# Patient Record
Sex: MALE | Race: OTHER | URBAN - METROPOLITAN AREA
[De-identification: names, ages, dates, MRNs, and addresses within clinical notes are randomized per-mention and may not be internally consistent; named-entity substitution may affect disease eponyms.]

---

## 2018-03-09 ENCOUNTER — OFFICE VISIT (OUTPATIENT)
Dept: FAMILY MEDICINE CLINIC | Age: 58
End: 2018-03-09

## 2018-03-09 ENCOUNTER — HOSPITAL ENCOUNTER (OUTPATIENT)
Dept: LAB | Age: 58
Discharge: HOME | End: 2018-03-09

## 2018-03-09 VITALS
SYSTOLIC BLOOD PRESSURE: 176 MMHG | WEIGHT: 214 LBS | DIASTOLIC BLOOD PRESSURE: 89 MMHG | HEIGHT: 66 IN | HEART RATE: 75 BPM | TEMPERATURE: 99.1 F | BODY MASS INDEX: 34.39 KG/M2

## 2018-03-09 DIAGNOSIS — E11.9 TYPE 2 DIABETES MELLITUS WITHOUT COMPLICATION, WITHOUT LONG-TERM CURRENT USE OF INSULIN (HCC): Primary | ICD-10-CM

## 2018-03-09 DIAGNOSIS — I10 ESSENTIAL HYPERTENSION: ICD-10-CM

## 2018-03-09 DIAGNOSIS — Z13.9 ENCOUNTER FOR SCREENING: ICD-10-CM

## 2018-03-09 DIAGNOSIS — E11.9 TYPE 2 DIABETES MELLITUS WITHOUT COMPLICATION, WITHOUT LONG-TERM CURRENT USE OF INSULIN (HCC): ICD-10-CM

## 2018-03-09 LAB
ALBUMIN SERPL-MCNC: 3.6 G/DL (ref 3.5–5)
ALBUMIN/GLOB SERPL: 0.9 {RATIO} (ref 1.1–2.2)
ALP SERPL-CCNC: 99 U/L (ref 45–117)
ALT SERPL-CCNC: 29 U/L (ref 12–78)
ANION GAP SERPL CALC-SCNC: 9 MMOL/L (ref 5–15)
APPEARANCE UR: CLEAR
AST SERPL-CCNC: 15 U/L (ref 15–37)
BACTERIA URNS QL MICRO: NEGATIVE /HPF
BILIRUB SERPL-MCNC: 0.5 MG/DL (ref 0.2–1)
BILIRUB UR QL: NEGATIVE
BUN SERPL-MCNC: 16 MG/DL (ref 6–20)
BUN/CREAT SERPL: 20 (ref 12–20)
CALCIUM SERPL-MCNC: 9 MG/DL (ref 8.5–10.1)
CHLORIDE SERPL-SCNC: 104 MMOL/L (ref 97–108)
CHOLEST SERPL-MCNC: 208 MG/DL
CO2 SERPL-SCNC: 25 MMOL/L (ref 21–32)
COLOR UR: ABNORMAL
CREAT SERPL-MCNC: 0.82 MG/DL (ref 0.7–1.3)
CREAT UR-MCNC: 88.8 MG/DL
EPITH CASTS URNS QL MICRO: ABNORMAL /LPF
ERYTHROCYTE [DISTWIDTH] IN BLOOD BY AUTOMATED COUNT: 12.8 % (ref 11.5–14.5)
EST. AVERAGE GLUCOSE BLD GHB EST-MCNC: 171 MG/DL
GLOBULIN SER CALC-MCNC: 4 G/DL (ref 2–4)
GLUCOSE POC: 300 MG/DL
GLUCOSE SERPL-MCNC: 266 MG/DL (ref 65–100)
GLUCOSE UR STRIP.AUTO-MCNC: >1000 MG/DL
HBA1C MFR BLD: 7.6 % (ref 4.2–6.3)
HCT VFR BLD AUTO: 50.7 % (ref 36.6–50.3)
HDLC SERPL-MCNC: 69 MG/DL
HDLC SERPL: 3 {RATIO} (ref 0–5)
HGB BLD-MCNC: 17.4 G/DL (ref 12.1–17)
HGB UR QL STRIP: ABNORMAL
HYALINE CASTS URNS QL MICRO: ABNORMAL /LPF (ref 0–5)
KETONES UR QL STRIP.AUTO: NEGATIVE MG/DL
LDLC SERPL CALC-MCNC: 121 MG/DL (ref 0–100)
LEUKOCYTE ESTERASE UR QL STRIP.AUTO: NEGATIVE
LIPID PROFILE,FLP: ABNORMAL
MCH RBC QN AUTO: 29.4 PG (ref 26–34)
MCHC RBC AUTO-ENTMCNC: 34.3 G/DL (ref 30–36.5)
MCV RBC AUTO: 85.8 FL (ref 80–99)
MICROALBUMIN UR-MCNC: 228 MG/DL
MICROALBUMIN/CREAT UR-RTO: 2568 MG/G (ref 0–30)
NITRITE UR QL STRIP.AUTO: NEGATIVE
NRBC # BLD: 0 K/UL (ref 0–0.01)
NRBC BLD-RTO: 0 PER 100 WBC
PH UR STRIP: 5.5 [PH] (ref 5–8)
PLATELET # BLD AUTO: 212 K/UL (ref 150–400)
PMV BLD AUTO: 11.6 FL (ref 8.9–12.9)
POTASSIUM SERPL-SCNC: 4.5 MMOL/L (ref 3.5–5.1)
PROT SERPL-MCNC: 7.6 G/DL (ref 6.4–8.2)
PROT UR STRIP-MCNC: 300 MG/DL
RBC # BLD AUTO: 5.91 M/UL (ref 4.1–5.7)
RBC #/AREA URNS HPF: ABNORMAL /HPF (ref 0–5)
SODIUM SERPL-SCNC: 138 MMOL/L (ref 136–145)
SP GR UR REFRACTOMETRY: 1.02 (ref 1–1.03)
TRIGL SERPL-MCNC: 90 MG/DL (ref ?–150)
UROBILINOGEN UR QL STRIP.AUTO: 0.2 EU/DL (ref 0.2–1)
VLDLC SERPL CALC-MCNC: 18 MG/DL
WBC # BLD AUTO: 6.9 K/UL (ref 4.1–11.1)
WBC URNS QL MICRO: ABNORMAL /HPF (ref 0–4)

## 2018-03-09 PROCEDURE — 80061 LIPID PANEL: CPT | Performed by: NURSE PRACTITIONER

## 2018-03-09 PROCEDURE — 85027 COMPLETE CBC AUTOMATED: CPT | Performed by: NURSE PRACTITIONER

## 2018-03-09 PROCEDURE — 80053 COMPREHEN METABOLIC PANEL: CPT | Performed by: NURSE PRACTITIONER

## 2018-03-09 PROCEDURE — 82043 UR ALBUMIN QUANTITATIVE: CPT | Performed by: NURSE PRACTITIONER

## 2018-03-09 PROCEDURE — 81001 URINALYSIS AUTO W/SCOPE: CPT | Performed by: NURSE PRACTITIONER

## 2018-03-09 PROCEDURE — 83036 HEMOGLOBIN GLYCOSYLATED A1C: CPT | Performed by: NURSE PRACTITIONER

## 2018-03-09 RX ORDER — METFORMIN HYDROCHLORIDE 500 MG/1
1000 TABLET, EXTENDED RELEASE ORAL 2 TIMES DAILY WITH MEALS
Qty: 120 TAB | Refills: 3 | Status: SHIPPED | OUTPATIENT
Start: 2018-03-09 | End: 2018-04-30 | Stop reason: SDUPTHER

## 2018-03-09 RX ORDER — LISINOPRIL AND HYDROCHLOROTHIAZIDE 12.5; 2 MG/1; MG/1
1 TABLET ORAL
Qty: 30 TAB | Refills: 3 | Status: SHIPPED | OUTPATIENT
Start: 2018-03-09 | End: 2018-04-30 | Stop reason: SDUPTHER

## 2018-03-09 RX ORDER — GLIPIZIDE 10 MG/1
10 TABLET ORAL 2 TIMES DAILY
Qty: 60 TAB | Refills: 3 | Status: SHIPPED | OUTPATIENT
Start: 2018-03-09 | End: 2018-04-30 | Stop reason: SDUPTHER

## 2018-03-09 NOTE — PROGRESS NOTES
The following was performed at discharge station per provider with the assistance of , Sandhya Banuelos:   AVS printed, reviewed with pt and given to pt. Reviewed medications with pt. Gave pt the glucometer resource sheet and reviewed it with him. Pt stated he has used a glucometer in the past.  Pt has appts for follow up and also with nutritionist.   Pt expressed understanding of the above information. Joellen Arevalo.

## 2018-03-09 NOTE — PATIENT INSTRUCTIONS
Aprenda acerca de la dieta mediterránea - [ Learning About the [address] Diet ]  ¿Qué es la dieta mediterránea? La dieta mediterránea es un estilo de alimentación, en lugar de un plan de dieta. Consiste en alimentos consumidos en Lajune Canner, el sara de [place] y [place], y otros países a lo tima del [facility]. Resalta comer alimentos jeane pescado, frutas, verduras, frijoles (habichuelas), panes ricos en fibra y granos integrales, michelle secos y aceite de [place]. Julianna estilo de alimentación incluye comer cantidades limitadas de carne Fern Spare y dulces. ¿Por qué elegir la dieta mediterránea? Tabares Even de estilo mediterráneo puede mejorar la leo del corazón. Contiene más grasa que otras dietas saludables para el corazón. Leo las grasas provienen principalmente de michelle secos, aceites no saturados (jeane los aceites del pescado y el aceite de [place]) y ciertos aceites de michelle secos o semillas (tales jeane el aceite de canola, soya o Tammy Bunk). Estas grasas pueden ayudar a proteger Kip Gains y los vasos sanguíneos. ¿Cómo puede usted comenzar reji dieta mediterránea? Estas son algunas cosas que usted puede hacer para cambiar a un estilo de alimentación más similar a la dieta mediterránea. Qué comer  · Coma reji variedad de frutas y verduras cada día, tales jeane uvas, arándanos, tomates, brócoli, pimientos, higos, aceitunas, espinacas, berenjenas, frijoles, lentejas y garbanzos. · Coma reji variedad de alimentos de grano integral cada día, tales jeane william, arroz integral y pan, pasta y cuscús integrales. · Coma pescado al menos 2 veces a la semana. Pruebe el atún, el salmón, la caballa, la [place], el arenque o las belkis. · Consuma cantidades moderadas de productos lácteos bajos en grasa, jeane Maddy [place] o yogur. · Consuma cantidades moderadas de aves y [place].   · Elija grasas saludables (insaturadas), jeane michelle secos, aceite de zuniga y ciertos aceites de michelle secos o semillas, jeane el de canola (colza), soya y Silas Kub. · Limite las grasas no saludables (saturadas), jeane [place], aceite de ureña y aceite de [place]. Y limite las grasas de origen animal, jeane carne y productos lácteos elaborados con [employer] entera. Trate de comer missy wilkins solo unas pocas veces al mes en cantidades muy pequeñas. · Limite los dulces y postres a solo un par de veces a la semana. [place] incluye bebidas [facility]. La dieta mediterránea también puede incluir vino tinto con las comidas: 1 vaso cada día para las mujeres y WATZING 2 vasos al día para los hombres. Consejos al comer en casa  · Utilice hierbas, especias, ajo, corteza de ampaor y [place] de cítricos en lugar de sal para armando sabor a los alimentos. · Añada rodajas de aguacate a dewitt sándwich en lugar de tocino. · Consuma pescado en el almuerzo o la karen en lugar de carne [place]. Frote el pescado con aceite de zuniga y cocínelo al horno o a la ileana. · [place]ore[place] la ensalada con semillas o michelle secos en lugar de queso. · Cocine con aceite de zuniga o de canola en lugar de mantequilla o aceites con alto contenido de grasas saturadas. · Cambie de leche al 2% o entera a leche al 1% o descremada. · Unte las verduras crudas en un aderezo de vinagreta o puré de garbanzos en lugar de salsas hechas de mayonesa o crema agria. · Disfrute un pedazo de fruta para el postre en lugar de un pedazo de torta. Pruebe manzanas al horno, o coma algunas frutas secas. Consejos al comer afuera  · Pruebe pescado cocido, asado a la ileana u horneado, en lugar de comerlo frito o empanado. · Pídale al camarero (mesero) que le preparen la comida con aceite de zuniga en lugar de [place]. · Pida platos hechos con salsa marinera o salsas hechas con aceite de zuniga. Evite las salsas hechas con Milo Billing. · Elija panes integrales, pasta y masa de pizza hechas de afsaneh integral, arroz integral, frijoles y lentejas.   · Reduzca el consumo de mantequilla o margarina en el pan. En dewitt lugar, puede untar el pan en reji pequeña cantidad de aceite de zuniga. · [place] acompañamiento, pida reji ensalada pequeña o verduras asadas en lugar de maurisio fritas. ¿Dónde puede encontrar más información en inglés? Lori Lower a http://leah-franklin.info/. Escriba O407 en la búsqueda para aprender más acerca de \"Aprenda acerca de la dieta mediterránea - [ Learning About the [address] [email or web address] Diet ]. \"  Revisado: 12 azul, 2017  Versión del contenido: 11.4  © 5371-0995 Healthwise, Incorporated. Las instrucciones de cuidado fueron adaptadas bajo licencia por [employer] (which disclaims liability or warranty for this information). Si usted tiene [place] afección médica o sobre estas instrucciones, siempre pregunte a dewitt profesional de leo. Healthwise, Incorporated niega toda garantía o responsabilidad por dewitt uso de esta información.

## 2018-03-09 NOTE — PROGRESS NOTES
Subjective:     Chief Complaint   Patient presents with    Diabetes     Diabetes +  Medication needed    Elevated Blood Pressure     Elevated Blood Pressure        He  is a 66 y.o. male who presents for evaluation of DM 2 and HTN. Pt recently arrived from [facility] and stopped taking all Rx approx 2 weeks. Was on Rx for HTN and DM 2. Pt brought his records from PCP from [address] [facility]. Last labs approx 2 years ago. Reports he was checking sugars prior to running out of strips 1 month ago. AM sugars had previously been in the [id number], lows around 100 and highs of 300. No acute S&S nor health complaints today. PMH: HTN & DM 2    Surg:  Denies     NKDA denies     Current Rx/supplements: see list     Social:  Pt denies tobacco nor drug use. Drinks 1-4 beers/daily. Pt works in maintenance. Pt is not , 3 adult children. Family Hx: DM 2 and CA (unspecified type)         ROS  Gen - no fever/chills  Resp - no dyspnea or cough  CV - no chest pain or PARKER  Rest per HPI    No past medical history on file. No past surgical history on file. No current outpatient prescriptions on file prior to visit. No current facility-administered medications on file prior to visit.          Objective:     Vitals:    03/09/18 0855 03/09/18 0903   BP: (!) 185/91 176/89   Pulse: 75 75   Temp: 99.1 °F (37.3 °C)    TempSrc: Oral    Weight: 214 lb (97.1 kg)    Height: 5' 5.95\" (1.675 m)        Physical Examination:  General appearance - alert, well appearing, and in no distress  Eyes -sclera anicteric  Neck - supple, no significant adenopathy, no thyromegaly  Chest - clear to auscultation, no wheezes, rales or rhonchi, symmetric air entry  Heart - normal rate, regular rhythm, normal S1, S2, no murmurs, rubs, clicks or gallops  Neurological - alert, oriented, no focal findings or movement disorder noted  Abdomen-BS present/WNL x 4 quads, non-tender/distended, soft,no organomegaly    Recent Results (from the past 12 hour(s))   AMB POC GLUCOSE BLOOD, BY GLUCOSE MONITORING DEVICE    Collection Time: 03/09/18  9:05 AM   Result Value Ref Range    Glucose  mg/dL         Assessment/ Plan:   Diagnoses and all orders for this visit:    1. Type 2 diabetes mellitus without complication, without long-term current use of insulin (HCC)  -     METABOLIC PANEL, COMPREHENSIVE; Future  -     HEMOGLOBIN A1C WITH EAG; Future  -     CBC W/O DIFF; Future  -     MICROALBUMIN, UR, RAND W/ MICROALB/CREAT RATIO; Future  -     URINALYSIS W/ RFLX MICROSCOPIC; Future  -     LIPID PANEL; Future  -     metFORMIN ER (GLUCOPHAGE XR) 500 mg tablet; Take 2 Tabs by mouth two (2) times daily (with meals). -     glipiZIDE (GLUCOTROL) 10 mg tablet; Take 1 Tab by mouth two (2) times a day. -     REFERRAL TO NUTRITION    2. Essential hypertension  -     METABOLIC PANEL, COMPREHENSIVE; Future  -     CBC W/O DIFF; Future  -     MICROALBUMIN, UR, RAND W/ MICROALB/CREAT RATIO; Future  -     URINALYSIS W/ RFLX MICROSCOPIC; Future  -     LIPID PANEL; Future  -     lisinopril-hydroCHLOROthiazide (PRINZIDE, ZESTORETIC) 20-12.5 mg per tablet; Take 1 Tab by mouth every morning.  -     REFERRAL TO NUTRITION    3. Encounter for screening  -     AMB POC GLUCOSE BLOOD, BY GLUCOSE MONITORING DEVICE        Resume prior Rx, noting Pt should max out dose of metformin, 1g BID, to help w/ sugar and weight control. Check baseline labs. Refer to RD for nutrition counseling for DM 2 and HTN. Recommend Pt resume checking glucoses, will provide glucometer sheet at discharge. Re-eval BP and glucose response in 4-6 weeks. I have discussed the diagnosis with the patient and the intended plan as seen in the above orders. The patient has received an after-visit summary and questions were answered concerning future plans. I have discussed medication side effects and warnings with the patient as well.   The patient verbalizes understanding and agreement with the plan. Follow-up Disposition:  Return in about 6 weeks (around 4/20/2018).

## 2018-03-09 NOTE — PROGRESS NOTES
Results for orders placed or performed in visit on 03/09/18   AMB POC GLUCOSE BLOOD, BY GLUCOSE MONITORING DEVICE   Result Value Ref Range    Glucose  mg/dL

## 2018-03-15 ENCOUNTER — OFFICE VISIT (OUTPATIENT)
Dept: FAMILY MEDICINE CLINIC | Age: 58
End: 2018-03-15

## 2018-03-15 DIAGNOSIS — Z71.3 DIETARY COUNSELING AND SURVEILLANCE: Primary | ICD-10-CM

## 2018-03-23 NOTE — PROGRESS NOTES
Mr. Jennifer Linares was referred to RD for T2DM nutrition education. [facility]  #[id number]  Current weight 214 lbs  BMI 34.6 classifying pt as obesity class I  A1C 7.6 on 3/9/18  Mr. Jennifer Linares works [employer] \"I am active all day long. \" Walks occ for exercise  Mr. Jennifer Linares states that his diet was very different in [facility]. Currently, he states that he rents a room and is not comfortable using the common kitchen. Pt states that he has a mini fridge and a hot plate or micro in his room. Eats most meals outside of home  RD introduced CHO foods and how they affect BG. Discussed that some foods have added sugar and some have naturally occurring sugars. Using food models, RD displayed common CHO foods and their appropriate portion sizes. Emphasized that these foods need to be limited, portioned and always eaten in combination with PRO. RD suggested using eggs as a lean PRO choice as these can be kept in a mini fridge and boiled in his room. Discussed choosing low sodium or no added salt canned goods that can be reheated in his room in place of instant soups. RD provided handout on good snack combos. Pt indicates good understanding.  Will F/u in 2months

## 2018-04-27 ENCOUNTER — DOCUMENTATION ONLY (OUTPATIENT)
Dept: FAMILY MEDICINE CLINIC | Age: 58
End: 2018-04-27

## 2018-04-27 NOTE — PROGRESS NOTES
Letter to notify pt of upcoming appt on 4/30/18 was returned marked \"undeliverable as addressed\". RN called pt, now answer, no voicemail set up. No additional attempts can be made regarding this appt which will occur in 3 days (after weekend).   Cynthia Vasquez RN

## 2018-04-30 ENCOUNTER — OFFICE VISIT (OUTPATIENT)
Dept: FAMILY MEDICINE CLINIC | Age: 58
End: 2018-04-30

## 2018-04-30 VITALS
TEMPERATURE: 98.3 F | DIASTOLIC BLOOD PRESSURE: 76 MMHG | WEIGHT: 218 LBS | HEIGHT: 67 IN | HEART RATE: 76 BPM | BODY MASS INDEX: 34.21 KG/M2 | SYSTOLIC BLOOD PRESSURE: 159 MMHG

## 2018-04-30 DIAGNOSIS — Z13.9 ENCOUNTER FOR SCREENING: Primary | ICD-10-CM

## 2018-04-30 DIAGNOSIS — E11.9 TYPE 2 DIABETES MELLITUS WITHOUT COMPLICATION, WITHOUT LONG-TERM CURRENT USE OF INSULIN (HCC): ICD-10-CM

## 2018-04-30 DIAGNOSIS — I10 ESSENTIAL HYPERTENSION: ICD-10-CM

## 2018-04-30 LAB — GLUCOSE POC: NORMAL MG/DL

## 2018-04-30 RX ORDER — GLIPIZIDE 10 MG/1
10 TABLET ORAL 2 TIMES DAILY
Qty: 180 TAB | Refills: 3 | Status: SHIPPED | OUTPATIENT
Start: 2018-04-30 | End: 2019-02-07 | Stop reason: SDUPTHER

## 2018-04-30 RX ORDER — LISINOPRIL AND HYDROCHLOROTHIAZIDE 12.5; 2 MG/1; MG/1
1 TABLET ORAL
Qty: 90 TAB | Refills: 3 | Status: SHIPPED | OUTPATIENT
Start: 2018-04-30 | End: 2019-02-07 | Stop reason: SDUPTHER

## 2018-04-30 RX ORDER — AMLODIPINE BESYLATE 5 MG/1
5 TABLET ORAL DAILY
Qty: 90 TAB | Refills: 1 | Status: SHIPPED | OUTPATIENT
Start: 2018-04-30 | End: 2019-02-07 | Stop reason: SDUPTHER

## 2018-04-30 RX ORDER — METFORMIN HYDROCHLORIDE 500 MG/1
1000 TABLET, EXTENDED RELEASE ORAL 2 TIMES DAILY WITH MEALS
Qty: 360 TAB | Refills: 3 | Status: SHIPPED | OUTPATIENT
Start: 2018-04-30 | End: 2019-02-07 | Stop reason: SDUPTHER

## 2018-04-30 NOTE — PATIENT INSTRUCTIONS
Aprenda acerca de las pautas alimentarias para diabetes - [ Learning About Diabetes Food Guidelines ]  Instrucciones de cuidado    La planificación de las comidas es importante para el manejo de la diabetes. Ayuda a mantener el azúcar en la carlitos en el nivel ideal (que usted fija con dewitt médico). Usted no tiene que comer alimentos especiales. Puede comer lo mismo que dewitt boo, incluso dulces de vez en cuando. Leo debe prestar atención a la cantidad y la frecuencia con que come ciertos alimentos. Aurelio vez desee colaborar con un dietista o educador de diabetes certificado (CDE, por ian siglas en inglés) para que le ayuden a planificar las comidas y los refrigerios. Un dietista o CDE también puede ayudarle a bajar de peso si tirso es jessica de ian objetivos. ¿Qué debería saber acerca de ingerir carbohidratos? Manejar la cantidad de carbohidratos que ingiere es reji parte importante de la alimentación saludable cuando tiene diabetes. Los carbohidratos se encuentran en muchos alimentos. · Sepa qué alimentos contienen carbohidratos. Y aprenda qué cantidad de carbohidratos contienen los diferentes alimentos. ¨ El pan, los cereales, la pasta y el arroz tienen aproximadamente 15 gramos de carbohidratos por porción. Reji porción equivale a 1 rebanada de pan (1 onza o 28 g), ½ taza de cereal cocido o 1/3 de taza de pasta o arroz cocidos. ¨ Las frutas tienen 15 gramos de carbohidratos por porción. [place] porción es 1 fruta fresca pequeña, jeane reji [place] o reji naranja; ½ banana (plátano); ½ taza de fruta cocida o enlatada; ½ taza de jugo de fruta; 1 taza de melón o frambuesas; o 2 cucharadas de frutas secas. ¨ La leche y el yogur sin azúcar agregado tienen 15 gramos de carbohidratos por porción. [place] porción es 1 taza de [place] o 2/3 taza de yogur sin azúcar agregado. ¨ Las verduras con almidón tienen 15 gramos de carbohidratos por porción.  [place] porción es ½ taza de puré de papa o camote (batata, marilu); 1 taza de calabacín; ½ papa horneada pequeña; ½ taza de frijoles cocidos; o ½ taza de maíz (elote) o arvejas (chícharos) cocidos. · Aprenda cuántos carbohidratos debe consumir cada día y en cada comida. Un dietista o CDE le puede enseñar cómo llevar la cuenta de los carbohidratos que consume. A esto se le llama recuento de carbohidratos. · Si no está seguro de cómo [place] gramos de carbohidratos, utilice el Método del [place] para planificar las comidas. Es reji [place] y rápida de asegurarse de que consuma comidas equilibradas. También le ayuda a distribuir los carbohidratos yamel el día. ¨ Divida el plato por tipo de alimento. Llene medio plato con verduras sin almidón, ponga carne u otras proteínas en reji cuarta parte del plato y granos o verduras con almidón en el último cuarto del plato. A esto puede agregarle un pequeño pedazo de fruta y 3 taza de [employer] o yogur, según la cantidad de carbohidratos que deba consumir en reji comida. · Trate de comer aproximadamente la misma cantidad de carbohidratos en cada comida. No \"reserve\" dewitt cantidad diaria de carbohidratos para consumirlos en reji andrew comida. · Las proteínas contienen muy pocos o nada de carbohidratos por porción. Los ejemplos de proteínas incluyen carne de res, Laxmi heights, [place], [place], [place], tofu, [place]-barre, requesón (\"cottage cheese\") y la mantequilla de cacahuate [place]). Reji porción de carne son 3 onzas (85 g), lo cual es aproximadamente del tamaño de reji baraja de naipes. Los ejemplos de porciones de sustitutos de la carne (equivalente a 1 onza o 28 g de carne) son 1/4 de taza de requesón, 1 huevo, 1 cucharada de [place] de cacahuate y ½ taza de tofu. ¿Cómo puede comer fuera y aún así comer de modo saludable? · Aprenda a calcular los tamaños de las porciones de alimentos que contienen carbohidratos. Si mide la comida en casa, será más fácil calcular la cantidad en reji porción de comida de restaurante.   · Si el platillo que pide contiene demasiados carbohidratos (jeane maurisio, maíz o frijoles al horno), pida un alimento bajo en carbohidratos en barrera lugar. Pida reji ensalada o verduras. · Si [place] insulina, revise barrera azúcar en la carlitos antes y después de comer fuera para ayudarle a planear cuánto comer en el futuro. · Si usted come más carbohidratos de lo planeado en reji comida, dé un paseo o joseline otro tipo de ejercicio. [place] ayudará a reducir el azúcar en la carlitos. ¿Qué más debería saber? · Limite las grasas saturadas, jeane la grasa de la carne y productos lácteos. Esta es reji opción saludable, porque las personas que tienen diabetes tienen un mayor riesgo de enfermedades del corazón. Así que elija brown magros de carne y productos lácteos descremados o semidescremados. Utilice aceite de zuniga o de canola en lugar de mantequilla o manteca al cocinar. · No se salte comidas. Barrera nivel de azúcar en la carlitos puede bajar demasiado si usted se salta comidas y bhavya insulina o ciertos medicamentos para la diabetes. · Consulte con barrera médico antes de beber alcohol. El alcohol puede hacer que barrera azúcar en la carlitos baje demasiado. El alcohol también puede causar reji reacción adversa si usted bhavya ciertos medicamentos para la diabetes. La atención de seguimiento es reji parte clave de barrera tratamiento y seguridad. Asegúrese de hacer y acudir a todas las citas, y llame a barrera médico si está teniendo problemas. También es reji buena idea saber los resultados de los exámenes y mantener reji lista de los medicamentos que bhavya. ¿Dónde puede encontrar más información en inglés? Goyo Palomares a http://leah-franklin.info/. Hie[place]arona Else X725 en la búsqueda para aprender más acerca de \"Aprenda acerca de las pautas alimentarias para diabetes - [ Learning About Diabetes Food Guidelines ]. \"  Revisado: 13 marzo, 2017  Versión del contenido: 11.4  © 2012-8130 Healthwise, Incorporated.  Las instrucciones de cuidado fueron adaptadas bajo licencia por [employer] (which disclaims liability or warranty for this information). Si usted tiene [place] afección médica o sobre estas instrucciones, siempre pregunte a dewitt profesional de leo. [facility] niega toda garantía o responsabilidad por dewitt uso de esta información.

## 2018-04-30 NOTE — PROGRESS NOTES
Printed AVS, provided to pt and reviewed. Pt indicated understanding and had no questions. Told pt that rx's have been sent to pharmacy and they should be ready for  in approximately 2 hrs. Pt told to please present GoodRx. com coupon which we provide to your pharmacy to receive discounted price. Reviewed all medication's with the pt. Kale Mathews was the .   Julissa Raman RN

## 2018-04-30 NOTE — PROGRESS NOTES
Results for orders placed or performed in visit on 04/30/18   AMB POC GLUCOSE BLOOD, BY GLUCOSE MONITORING DEVICE   Result Value Ref Range    Glucose POC f 193 mg/dL     Coordination of Care  1. Have you been to the ER, urgent care clinic since your last visit? Hospitalized since your last visit? No    2. Have you seen or consulted any other health care providers outside of the [address] [place] [address] since your last visit? Include any pap smears or colon screening. No    Does the patient need refills? N/A    Learning Assessment Complete?  yes  Depression Screening complete in the past 12 months? yes

## 2018-07-07 ENCOUNTER — LAB ONLY (OUTPATIENT)
Dept: FAMILY MEDICINE CLINIC | Age: 58
End: 2018-07-07

## 2018-07-07 ENCOUNTER — HOSPITAL ENCOUNTER (OUTPATIENT)
Dept: LAB | Age: 58
Discharge: HOME | End: 2018-07-07

## 2018-07-07 DIAGNOSIS — E11.9 TYPE 2 DIABETES MELLITUS WITHOUT COMPLICATION, WITHOUT LONG-TERM CURRENT USE OF INSULIN (HCC): ICD-10-CM

## 2018-07-07 LAB
EST. AVERAGE GLUCOSE BLD GHB EST-MCNC: 143 MG/DL
HBA1C MFR BLD: 6.6 % (ref 4.2–6.3)

## 2018-07-07 PROCEDURE — 83036 HEMOGLOBIN GLYCOSYLATED A1C: CPT | Performed by: NURSE PRACTITIONER

## 2018-07-07 NOTE — PROGRESS NOTES
Statements below are documented by William Velásquez MA, patient here for fasting labs only, labs drawn was an A1C  in the RA, patient left lab with no bleeding, no pain, and feeling well. Patient was advise with the help of Beatriz as the , that a Dr or Nurse will call with the results if anything is abnormal, if everything is normal no phone call will be done but they can also wait till the next visit to discuss it with the Dr. Derry Runner, Medical Assistant.

## 2018-08-16 NOTE — TELEPHONE ENCOUNTER
Received \"Need for Clarification\" fax from patient's [address]. Chart reviewed. patient's is currently on Lisinopril/HCTZ 20-12.5mg tab, 1 tablets po every morning. Patient's pharmacy send a message \"notes to provider: can we switch to lisinopril/hctz 20/25 for cost?\". Patient LOV was 04/30/18 and you recommended for him to RTC in 3 months, labs before appt. Patient had an [address] in 07/07/18 but no office visit after that. Routing to provider for review.

## 2018-08-23 NOTE — TELEPHONE ENCOUNTER
LAURA Patel   to Lucius Walker, BERNIE           2:10 PM   Thank you for including his visit history. I am okay with the change IF he will come in for visit within one month. Thanks . I am going to FAX back to Donal Granda John Paule the above message from [address] [place] [address] regarding approval and request to pt that he return for a provider follow up within one month.  Gregory Lin RN

## 2019-02-07 DIAGNOSIS — E11.9 TYPE 2 DIABETES MELLITUS WITHOUT COMPLICATION, WITHOUT LONG-TERM CURRENT USE OF INSULIN (HCC): ICD-10-CM

## 2019-02-07 DIAGNOSIS — I10 ESSENTIAL HYPERTENSION: ICD-10-CM

## 2019-02-07 NOTE — TELEPHONE ENCOUNTER
Luz Lockwood, calling on behalf of patient who is in need of meds for high blood pressure, and diabetes. Completely out. Requesting an appointment as well - no tickler noted in system. Also requesting lab results.

## 2019-02-07 NOTE — TELEPHONE ENCOUNTER
Nurse telephoned patient to home/cell no answer. Left message to contact us back. Nurse telephoned emergency contact Eran Gandhi on [address] who states that patient run out of all his medication 2 days ago. MS. Zaida Ray will instruct patient to go to our clinic at [facility] on 02/15 or 02/16 She was made aware that patient was supposed to follow up in 3 months after his 4/2018 office visit and never did. Ms. Zaida Ray is asking if we can send a few days worth of medication at least to last until patient gets seen. Routing message to the provider for review.

## 2019-02-08 RX ORDER — METFORMIN HYDROCHLORIDE 500 MG/1
1000 TABLET, EXTENDED RELEASE ORAL 2 TIMES DAILY WITH MEALS
Qty: 120 TAB | Refills: 0 | Status: SHIPPED | OUTPATIENT
Start: 2019-02-08 | End: 2019-02-15 | Stop reason: SDUPTHER

## 2019-02-08 RX ORDER — AMLODIPINE BESYLATE 5 MG/1
5 TABLET ORAL DAILY
Qty: 30 TAB | Refills: 0 | Status: SHIPPED | OUTPATIENT
Start: 2019-02-08 | End: 2019-02-15 | Stop reason: SDUPTHER

## 2019-02-08 RX ORDER — LISINOPRIL AND HYDROCHLOROTHIAZIDE 12.5; 2 MG/1; MG/1
1 TABLET ORAL
Qty: 30 TAB | Refills: 0 | Status: SHIPPED | OUTPATIENT
Start: 2019-02-08 | End: 2019-02-15 | Stop reason: SDUPTHER

## 2019-02-08 RX ORDER — GLIPIZIDE 10 MG/1
10 TABLET ORAL 2 TIMES DAILY
Qty: 60 TAB | Refills: 0 | Status: SHIPPED | OUTPATIENT
Start: 2019-02-08 | End: 2019-02-15 | Stop reason: SDUPTHER

## 2019-02-15 ENCOUNTER — HOSPITAL ENCOUNTER (OUTPATIENT)
Dept: LAB | Age: 59
Discharge: HOME | End: 2019-02-15

## 2019-02-15 ENCOUNTER — OFFICE VISIT (OUTPATIENT)
Dept: FAMILY MEDICINE CLINIC | Age: 59
End: 2019-02-15

## 2019-02-15 VITALS
WEIGHT: 222 LBS | BODY MASS INDEX: 35.68 KG/M2 | HEIGHT: 66 IN | SYSTOLIC BLOOD PRESSURE: 169 MMHG | HEART RATE: 77 BPM | TEMPERATURE: 98.2 F | DIASTOLIC BLOOD PRESSURE: 89 MMHG

## 2019-02-15 DIAGNOSIS — E11.9 TYPE 2 DIABETES MELLITUS WITHOUT COMPLICATION, WITHOUT LONG-TERM CURRENT USE OF INSULIN (HCC): ICD-10-CM

## 2019-02-15 DIAGNOSIS — Z13.9 ENCOUNTER FOR SCREENING: ICD-10-CM

## 2019-02-15 DIAGNOSIS — I10 ESSENTIAL HYPERTENSION: Primary | ICD-10-CM

## 2019-02-15 PROBLEM — E66.01 SEVERE OBESITY (HCC): Status: ACTIVE | Noted: 2019-02-15

## 2019-02-15 PROBLEM — R80.8 OTHER PROTEINURIA: Status: ACTIVE | Noted: 2019-02-15

## 2019-02-15 LAB
ANION GAP SERPL CALC-SCNC: 7 MMOL/L (ref 5–15)
BUN SERPL-MCNC: 23 MG/DL (ref 6–20)
BUN/CREAT SERPL: 28 (ref 12–20)
CALCIUM SERPL-MCNC: 9.4 MG/DL (ref 8.5–10.1)
CHLORIDE SERPL-SCNC: 107 MMOL/L (ref 97–108)
CO2 SERPL-SCNC: 24 MMOL/L (ref 21–32)
CREAT SERPL-MCNC: 0.82 MG/DL (ref 0.7–1.3)
EST. AVERAGE GLUCOSE BLD GHB EST-MCNC: 143 MG/DL
GLUCOSE POC: NORMAL MG/DL
GLUCOSE SERPL-MCNC: 123 MG/DL (ref 65–100)
HBA1C MFR BLD: 6.6 % (ref 4.2–6.3)
POTASSIUM SERPL-SCNC: 5.3 MMOL/L (ref 3.5–5.1)
SODIUM SERPL-SCNC: 138 MMOL/L (ref 136–145)

## 2019-02-15 PROCEDURE — 83036 HEMOGLOBIN GLYCOSYLATED A1C: CPT

## 2019-02-15 PROCEDURE — 82043 UR ALBUMIN QUANTITATIVE: CPT

## 2019-02-15 PROCEDURE — 80048 BASIC METABOLIC PNL TOTAL CA: CPT

## 2019-02-15 RX ORDER — LISINOPRIL AND HYDROCHLOROTHIAZIDE 12.5; 2 MG/1; MG/1
1 TABLET ORAL
Qty: 90 TAB | Refills: 1 | Status: SHIPPED | OUTPATIENT
Start: 2019-02-15 | End: 2019-09-03 | Stop reason: SDUPTHER

## 2019-02-15 RX ORDER — METFORMIN HYDROCHLORIDE 500 MG/1
1000 TABLET, EXTENDED RELEASE ORAL 2 TIMES DAILY WITH MEALS
Qty: 360 TAB | Refills: 1 | Status: SHIPPED | OUTPATIENT
Start: 2019-02-15 | End: 2019-09-25 | Stop reason: SDUPTHER

## 2019-02-15 RX ORDER — AMLODIPINE BESYLATE 5 MG/1
5 TABLET ORAL DAILY
Qty: 90 TAB | Refills: 1 | Status: SHIPPED | OUTPATIENT
Start: 2019-02-15 | End: 2019-02-16 | Stop reason: DRUGHIGH

## 2019-02-15 RX ORDER — GLIPIZIDE 10 MG/1
10 TABLET ORAL 2 TIMES DAILY
Qty: 180 TAB | Refills: 1 | Status: SHIPPED | OUTPATIENT
Start: 2019-02-15 | End: 2019-09-25 | Stop reason: SDUPTHER

## 2019-02-15 RX ORDER — LISINOPRIL 10 MG/1
10 TABLET ORAL DAILY
Qty: 90 TAB | Refills: 1 | Status: SHIPPED | OUTPATIENT
Start: 2019-02-15 | End: 2019-02-16

## 2019-02-15 NOTE — PROGRESS NOTES
Results for orders placed or performed in visit on 02/15/19 AMB POC GLUCOSE BLOOD, BY GLUCOSE MONITORING DEVICE Result Value Ref Range Glucose POC 135nf mg/dL Coordination of Care 1. Have you been to the ER, urgent care clinic since your last visit? Hospitalized since your last visit? No 
 
2. Have you seen or consulted any other health care providers outside of the [address] [place] [address] since your last visit? Include any pap smears or colon screening. No 
 
Does the patient need refills? N/A Learning Assessment Complete? yes

## 2019-02-15 NOTE — PROGRESS NOTES
Patient seen for discharge with assistance from angeles Ballesteros. We reviewed the AVS, prescriptions,pharmacy location and the printed Good Rx coupon for Lisinopril-HCTZ. All of the patient's other prescriptions are on the [facility] $10 list per the Good Rx website. His [address] records were copied for the chart and the originals returned to the patient. He has been to registration to schedule nutrition, 6 week provider follow-up and [facility] financial screening appointments.  Lexii Figueroa RN

## 2019-02-15 NOTE — PROGRESS NOTES
Subjective:  
 
Trinh Roe is a 66 y.o. male seen for follow up of diabetes and htn. Also h/o elevated ldl last year, has already eaten today. Had 2.5gm protein in urine last year, goal A1C. Diabetic Review of Systems - medication compliance: compliant most of the time, last eye exam approximately never. Other symptoms and concerns: right-sided low back pain only with certain movements for 2 days. No injury. The back feels better today. Bakari Pineda Patient Active Problem List  
Diagnosis Code  Severe obesity (HCC) E66.01 Lab Results Component Value Date/Time Cholesterol, total 208 (H) 03/09/2018 10:01 AM  
 HDL Cholesterol 69 03/09/2018 10:01 AM  
 LDL, calculated 121 (H) 03/09/2018 10:01 AM  
 Triglyceride 90 03/09/2018 10:01 AM  
 CHOL/HDL Ratio 3.0 03/09/2018 10:01 AM  
 
Lab Results Component Value Date/Time Sodium 138 03/09/2018 10:01 AM  
 Potassium 4.5 03/09/2018 10:01 AM  
 Chloride 104 03/09/2018 10:01 AM  
 CO2 25 03/09/2018 10:01 AM  
 Anion gap 9 03/09/2018 10:01 AM  
 Glucose 266 (H) 03/09/2018 10:01 AM  
 BUN 16 03/09/2018 10:01 AM  
 Creatinine 0.82 03/09/2018 10:01 AM  
 BUN/Creatinine ratio 20 03/09/2018 10:01 AM  
 GFR est AA >60 03/09/2018 10:01 AM  
 GFR est non-AA >60 03/09/2018 10:01 AM  
 Calcium 9.0 03/09/2018 10:01 AM  
  
Lab Results Component Value Date/Time Sodium 138 03/09/2018 10:01 AM  
 Potassium 4.5 03/09/2018 10:01 AM  
 Chloride 104 03/09/2018 10:01 AM  
 CO2 25 03/09/2018 10:01 AM  
 Anion gap 9 03/09/2018 10:01 AM  
 Glucose 266 (H) 03/09/2018 10:01 AM  
 BUN 16 03/09/2018 10:01 AM  
 Creatinine 0.82 03/09/2018 10:01 AM  
 BUN/Creatinine ratio 20 03/09/2018 10:01 AM  
 GFR est AA >60 03/09/2018 10:01 AM  
 GFR est non-AA >60 03/09/2018 10:01 AM  
 Calcium 9.0 03/09/2018 10:01 AM  
 Bilirubin, total 0.5 03/09/2018 10:01 AM  
 ALT (SGPT) 29 03/09/2018 10:01 AM  
 AST (SGOT) 15 03/09/2018 10:01 AM  
 Alk.  phosphatase 99 03/09/2018 10:01 AM  
 Protein, total 7.6 03/09/2018 10:01 AM  
 Albumin 3.6 03/09/2018 10:01 AM  
 Globulin 4.0 03/09/2018 10:01 AM  
 A-G Ratio 0.9 (L) 03/09/2018 10:01 AM  
   
 
Lab Results Component Value Date/Time Hemoglobin A1c 6.6 (H) 07/07/2018 09:48 AM  
 Hemoglobin A1c 7.6 (H) 03/09/2018 10:01 AM  
 
 
 
Objective:  
 
Vitals 2/15/2019 4/30/2018 3/9/2018 3/9/2018 Blood Pressure 169/89 159/76 176/89 185/91 Pulse 77 76 75 75 Temp 98.2 98.3 - 99.1 Height 5' 6.142\" 5' 6.535\" - 5' 5.945\" Weight 222 lb 218 lb - 214 lb  
BSA 2.17 m2 2.15 m2 - 2.13 m2 BMI 35.68 kg/m2 34.62 kg/m2 - 34.6 kg/m2 Appearance: alert, well appearing, and in no distress. Exam: heart sounds normal rate, regular rhythm, normal S1, S2, no murmurs, rubs, clicks or gallops, chest clear, no hepatosplenomegaly, no carotid bruits, no tenderness in low or mid-right or left sides of back. Lab review: see microalb 3/18. Assessment/Plan:  
 
diabetes well controlled, hypertension poorly controlled, hyperlipidemia borderline controlled, backache with nl exam, CKD probably secondary to poorly-controlled htn. .. Recheck microalb, add 10mg lisinopril, recheck renal function. F/u 4-6 weeks recheck bp and creat on higher dose of ACEI. Backache-- no rx needed. Use heat or ice, can take tylenol if needed.

## 2019-02-16 ENCOUNTER — TELEPHONE (OUTPATIENT)
Dept: FAMILY MEDICINE CLINIC | Age: 59
End: 2019-02-16

## 2019-02-16 LAB
CREAT UR-MCNC: 75.7 MG/DL
MICROALBUMIN UR-MCNC: 103 MG/DL
MICROALBUMIN/CREAT UR-RTO: 1361 MG/G (ref 0–30)

## 2019-02-16 RX ORDER — AMLODIPINE BESYLATE 10 MG/1
10 TABLET ORAL DAILY
Qty: 90 TAB | Refills: 1 | Status: SHIPPED | OUTPATIENT
Start: 2019-02-16 | End: 2019-09-23 | Stop reason: SDUPTHER

## 2019-02-16 NOTE — PROGRESS NOTES
K+ slightly elevated. I had increased lisinopril dose, will contact him and instead increase amlodipine.   Sent rx, d/brit lis 10mg, and will also contact pharmacy this am.

## 2019-02-16 NOTE — PROGRESS NOTES
Discussed with pt through [facility]  that K+ is a little high. Avoid bananas and oj for now, and I do not want him to take the new dose of lisinopril. Instead I am increasing the amlodipine and sent rx to his pharmacy for it. He hasn't picked up the 10mg lisinopril yet, so I am going to call the pharmacy now to cancel the 10mg lis. Continue all other meds the same. Had pt repeat back through  and he uncerstands.

## 2019-03-12 ENCOUNTER — OFFICE VISIT (OUTPATIENT)
Dept: FAMILY MEDICINE CLINIC | Age: 59
End: 2019-03-12

## 2019-03-12 DIAGNOSIS — Z71.3 DIETARY COUNSELING AND SURVEILLANCE: Primary | ICD-10-CM

## 2019-03-12 NOTE — PROGRESS NOTES
Mr. Newton Pittman was referred to RD for HTN and T2DM nutrition education.  used for this appt. *Pt arrived 20 minutes late allowing us only an abbreviated appointment  Current weight 222 lbs  BMI 35 classifying pt as obesity class II  A1C 6.6 on 2/15/19  Pt still renting a room with access to a refrigerator and microwave. Working FT, states he's very active at work. 2 meals/day  RD reviewed CHO foods. Using food models, reviewed portion sizes. RD suggested using pre-portioned and well balanced frozen meals such as Healthy Choice Simply as a dinner option (available at [employer]).    F/U in 1 month

## 2019-03-29 ENCOUNTER — HOSPITAL ENCOUNTER (OUTPATIENT)
Dept: LAB | Age: 59
Discharge: HOME | End: 2019-03-29

## 2019-03-29 ENCOUNTER — OFFICE VISIT (OUTPATIENT)
Dept: FAMILY MEDICINE CLINIC | Age: 59
End: 2019-03-29

## 2019-03-29 VITALS
HEART RATE: 96 BPM | DIASTOLIC BLOOD PRESSURE: 60 MMHG | SYSTOLIC BLOOD PRESSURE: 144 MMHG | WEIGHT: 222 LBS | TEMPERATURE: 98.9 F | BODY MASS INDEX: 35.68 KG/M2

## 2019-03-29 DIAGNOSIS — Z13.9 ENCOUNTER FOR SCREENING: ICD-10-CM

## 2019-03-29 DIAGNOSIS — Z13.9 ENCOUNTER FOR SCREENING: Primary | ICD-10-CM

## 2019-03-29 DIAGNOSIS — Z71.89 COUNSELING AND COORDINATION OF CARE: Primary | ICD-10-CM

## 2019-03-29 LAB — GLUCOSE POC: NORMAL MG/DL

## 2019-03-29 PROCEDURE — 82607 VITAMIN B-12: CPT

## 2019-03-29 PROCEDURE — 86592 SYPHILIS TEST NON-TREP QUAL: CPT

## 2019-03-29 PROCEDURE — 80048 BASIC METABOLIC PNL TOTAL CA: CPT

## 2019-03-29 NOTE — PROGRESS NOTES
Subjective:     Chief Complaint   Patient presents with    Medication Refill     he is a 64y.o. year old male who presents for evalution. Recheck bp on increased dose of amlodipine, notes that he keeps tripping on left foot. Reports that the foot seems to 'get stuck on the floor' at times, no asst. Pain.  thinks he doesn't pick it up well. Reports it happens up to 6x a day for the past few months. No pain with walking, does sometimes get cramps in the calf and bottom of the foot, but neither of these are exertional.  Denies any numbness or pins/needles. Has lbp on the right, nonradiating, when he bautista over. Recent A1C 6.6%, h/o proteinuria and significant htn, ldl of 121 1 year ago, recent RD visit. Not on a statin. Recent mild hyperkalemia, avoiding high K+ foods. Objective:     Vitals:    03/29/19 1402   BP: 144/60   Pulse: 96   Temp: 98.9 °F (37.2 °C)   TempSrc: Oral   Weight: 222 lb (100.7 kg)       Physical Examination: General appearance -NAD, nl gait, wt up about 8 lb over a year ago. Neurological - alert, oriented, normal speech, no focal findings or movement disorder noted, normal muscle tone, no tremors, strength 5/5, abnormal findings: decreased reflexes at left knee and ankle compared with the right. Heel and toe walk normal.  Sensation in tact to light touch feet and legs    Extremities - slightly decreased dp on left compared with right. No shiny skin on left, no dependent rubor, slight decreased hair both lower calves. Assessment/ Plan:     htn with proteinuria and hyperkalemia. Recheck bmp. bp is much improved although not quite at goal.  T2D-- glucose quite high today after lunch, recent A1C well-controlled. No med change for now, recheck 2-3 months, diet. Tripping left foot, sounds as if he fails adequte dorsiflexion intermittently. Nl strength testing, with decreased reflexes on the left,  and nl sensation to light touch. Check labs and will get emg/nct.   No med changes, will contact him with test results and consider whether he needs earlier f/u.     Results for orders placed or performed in visit on 03/29/19   AMB POC GLUCOSE BLOOD, BY GLUCOSE MONITORING DEVICE   Result Value Ref Range    Glucose POC nf 340 mg/dL       Orders Placed This Encounter    AMB POC GLUCOSE BLOOD, BY GLUCOSE MONITORING DEVICE

## 2019-03-29 NOTE — PROGRESS NOTES
Coordination of Care  1. Have you been to the ER, urgent care clinic since your last visit? Hospitalized since your last visit? No    2. Have you seen or consulted any other health care providers outside of the [address] [place] [address] since your last visit? Include any pap smears or colon screening. No    Does the patient need refills? YES    Learning Assessment Complete?  yes  Depression Screening complete in the past 12 months? yes   Results for orders placed or performed in visit on 03/29/19   AMB POC GLUCOSE BLOOD, BY GLUCOSE MONITORING DEVICE   Result Value Ref Range    Glucose POC nf 340 mg/dL

## 2019-03-30 LAB
ANION GAP SERPL CALC-SCNC: 6 MMOL/L (ref 5–15)
BUN SERPL-MCNC: 38 MG/DL (ref 6–20)
BUN/CREAT SERPL: 33 (ref 12–20)
CALCIUM SERPL-MCNC: 9.4 MG/DL (ref 8.5–10.1)
CHLORIDE SERPL-SCNC: 109 MMOL/L (ref 97–108)
CO2 SERPL-SCNC: 25 MMOL/L (ref 21–32)
CREAT SERPL-MCNC: 1.14 MG/DL (ref 0.7–1.3)
FOLATE SERPL-MCNC: 20.5 NG/ML (ref 5–21)
GLUCOSE SERPL-MCNC: 211 MG/DL (ref 65–100)
POTASSIUM SERPL-SCNC: 4.3 MMOL/L (ref 3.5–5.1)
SODIUM SERPL-SCNC: 140 MMOL/L (ref 136–145)
VIT B12 SERPL-MCNC: 214 PG/ML (ref 193–986)

## 2019-04-01 ENCOUNTER — DOCUMENTATION ONLY (OUTPATIENT)
Dept: FAMILY MEDICINE CLINIC | Age: 59
End: 2019-04-01

## 2019-04-01 LAB — RPR SER QL: NONREACTIVE

## 2019-04-02 DIAGNOSIS — E53.8 B12 DEFICIENCY: Primary | ICD-10-CM

## 2019-04-02 NOTE — PROGRESS NOTES
Dioni polo,  This patient is developing a foot drop probably due to B12 def. , and is on full dose metformin. Besides treating the deficiency, do you generally lower the dose of metformin in a situation like this?   Wilda Cody

## 2019-04-03 ENCOUNTER — TELEPHONE (OUTPATIENT)
Dept: FAMILY MEDICINE CLINIC | Age: 59
End: 2019-04-03

## 2019-04-03 NOTE — PROGRESS NOTES
Danial Navarro,    I have not seen a foot drop from B12 deficiency but I suppose it could happen. I agree with the nerve conduction studies but he may need an MRI of his lumbar spine to see if he has a pinched nerve as a cause of the foot drop. I would not decrease the metformin as it's keeping his A1c controlled and this is unlikely to have any effect on this condition.     Thanks,  Manav Tena

## 2019-04-03 NOTE — TELEPHONE ENCOUNTER
Patient returning phone call 4/3/19 . Patient says Carol Hopes call him and he would like to know the reason of the call .     Thank you     Att: Jose Marc

## 2019-04-05 RX ORDER — MAGNESIUM 200 MG
TABLET ORAL
Qty: 180 TAB | Refills: 1 | Status: SHIPPED | OUTPATIENT
Start: 2019-04-05

## 2019-04-05 NOTE — PROGRESS NOTES
Spoke with emergency contact after I couldn't reach him. He has her listed on permission to release info form. Discussed that B12 level is low, needs to start taking B12 orally and I am sending rx to his pharmacy. Also needs to come in for additional labs. She will relay the info to him, I will contact him with results of cbc and MMA.

## 2019-04-06 ENCOUNTER — LAB ONLY (OUTPATIENT)
Dept: FAMILY MEDICINE CLINIC | Age: 59
End: 2019-04-06

## 2019-04-06 ENCOUNTER — HOSPITAL ENCOUNTER (OUTPATIENT)
Dept: LAB | Age: 59
Discharge: HOME | End: 2019-04-06

## 2019-04-06 DIAGNOSIS — E53.8 B12 DEFICIENCY: ICD-10-CM

## 2019-04-06 LAB
BASOPHILS # BLD: 0.1 K/UL (ref 0–0.1)
BASOPHILS NFR BLD: 1 % (ref 0–1)
DIFFERENTIAL METHOD BLD: NORMAL
EOSINOPHIL # BLD: 0.1 K/UL (ref 0–0.4)
EOSINOPHIL NFR BLD: 2 % (ref 0–7)
ERYTHROCYTE [DISTWIDTH] IN BLOOD BY AUTOMATED COUNT: 12.8 % (ref 11.5–14.5)
HCT VFR BLD AUTO: 49.3 % (ref 36.6–50.3)
HGB BLD-MCNC: 16.3 G/DL (ref 12.1–17)
IMM GRANULOCYTES # BLD AUTO: 0 K/UL (ref 0–0.04)
IMM GRANULOCYTES NFR BLD AUTO: 0 % (ref 0–0.5)
LYMPHOCYTES # BLD: 1.4 K/UL (ref 0.8–3.5)
LYMPHOCYTES NFR BLD: 18 % (ref 12–49)
MCH RBC QN AUTO: 29.2 PG (ref 26–34)
MCHC RBC AUTO-ENTMCNC: 33.1 G/DL (ref 30–36.5)
MCV RBC AUTO: 88.2 FL (ref 80–99)
MONOCYTES # BLD: 0.8 K/UL (ref 0–1)
MONOCYTES NFR BLD: 10 % (ref 5–13)
NEUTS SEG # BLD: 5.5 K/UL (ref 1.8–8)
NEUTS SEG NFR BLD: 69 % (ref 32–75)
NRBC # BLD: 0 K/UL (ref 0–0.01)
NRBC BLD-RTO: 0 PER 100 WBC
PLATELET # BLD AUTO: 270 K/UL (ref 150–400)
PMV BLD AUTO: 10.6 FL (ref 8.9–12.9)
RBC # BLD AUTO: 5.59 M/UL (ref 4.1–5.7)
WBC # BLD AUTO: 7.8 K/UL (ref 4.1–11.1)

## 2019-04-06 PROCEDURE — 85025 COMPLETE CBC W/AUTO DIFF WBC: CPT

## 2019-04-06 PROCEDURE — 83921 ORGANIC ACID SINGLE QUANT: CPT

## 2019-04-06 NOTE — PROGRESS NOTES
Patient came in today for a lab only visit per Dr. Jose Luis Guerra. CBC and Methylmalonic Acid was drawn from L-AC without complications. Results pending.

## 2019-04-09 LAB
Lab: NORMAL
METHYLMALONATE SERPL-SCNC: 176 NMOL/L (ref 0–378)

## 2019-04-09 NOTE — PROGRESS NOTES
MMA and cbc nl, will tx as planned for B12 def but still needs emg and may need spine MRI. Will discuss with pt.

## 2019-04-12 ENCOUNTER — OFFICE VISIT (OUTPATIENT)
Dept: FAMILY MEDICINE CLINIC | Age: 59
End: 2019-04-12

## 2019-04-12 DIAGNOSIS — Z71.89 COUNSELING AND COORDINATION OF CARE: Primary | ICD-10-CM

## 2019-06-17 NOTE — PROGRESS NOTES
This was an AN referral.  I need Dr. Samantha Wong interpretation of the NCS, which showed several abnl.

## 2019-06-17 NOTE — PROGRESS NOTES
Has appt with me 7/19, is on B12 supplement now. I asked AN to get the NCS interpretation because 1 sensory and 2 motor results were abnl-- left peroneal at ankle and b fib motor, left sural at lat malleolus.

## 2019-06-21 NOTE — PROGRESS NOTES
I have result/interp of emg and NCT from Dr. Angelica Mcghee now, normal testing. Pt was having lbp at the time of the visit and the MD injected a trigger point. Pt has f/u with me 7/19.

## 2019-09-03 DIAGNOSIS — I10 ESSENTIAL HYPERTENSION: ICD-10-CM

## 2019-09-23 ENCOUNTER — CLINICAL SUPPORT (OUTPATIENT)
Dept: FAMILY MEDICINE CLINIC | Age: 59
End: 2019-09-23

## 2019-09-23 DIAGNOSIS — Z71.9 COUNSELED BY NURSE: Primary | ICD-10-CM

## 2019-09-23 DIAGNOSIS — Z79.899 MEDICATION MANAGEMENT: ICD-10-CM

## 2019-09-23 NOTE — PROGRESS NOTES
Patient present to the clinic today requesting a refill for Amlodipine and Lisinopril-HCTZ. There were not available appointment for patient to be seen by the provider today. Chart review and patient's LOV with Dr. Naresh Magaña was on 03/2019. On 02/2019 Rxs for: Metformin, Lisinopril-HCTZ, Glipizide and amlodipine were given by  Dr. Naresh Magaña for 90 days with one refill. Patient states that he has run out of both Lisinopril-HCTZ and Amlodpine and he needs a refill. Patient tells me that he was able to get Glipizide and metformin about 2 weeks ago from his pharamcy. Nurse called [address] [facility] to confirm and per [facility], patient was able to get the refill for Glipizide and Metformin  but does not have any refills left for Lisinopril-HCTZ and Amlodipine ( all of them from prescriptions from 02/2019) Nurse reviewed medication directions with patient and it appears that patient is taking medication correctly, no sure why he had refills left on 2 out of the 4 prescriptions)    Will send a refill request to provider. Instructed patient that in march, 2019 Dr. Naresh Magaña wanted to see him back in 3 months, patient to make the line. Instructed patient that I would send the request to the provider but I am also encouraging patient to make the line with in the next couple of days so that he can get follow up and refills for his other medications as well. Per chart I also noted that patient applied for medicaid in 04/2019 per patient he does not know the status of that, Nurse giving patient the medicaid expansion flyer so that he can call and check on the status of it. This has been fully explained to the patient, who indicates understanding and agrees with plan. No further questions at this time.  Elizabeth Alcala RN

## 2019-09-23 NOTE — TELEPHONE ENCOUNTER
Patient present to the clinic today requesting a refill for Amlodipine and Lisinopril-HCTZ. There were not available appointment for patient to be seen by the provider today. Chart review and patient's LOV with Dr. Vane Prescott was on 03/2019. On 02/2019 Rxs for: Metformin, Lisinopril-HCTZ, Glipizide and amlodipine were given by  Dr. Vane Prescott for 90 days with one refill. Patient states that he has run out of both Lisinopril-HCTZ and Amlodpine and he needs a refill. Patient tells me that he was able to get Glipizide and metformin about 2 weeks ago from his pharamcy. Nurse called [address] [facility] to confirm and per [facility], patient was able to get the refill for Glipizide and Metformin  but does not have any refills left for Lisinopril-HCTZ and Amlodipine ( all of them from prescriptions from 02/2019) Nurse reviewed medication directions with patient and it appears that patient is taking medication correctly, no sure why he had refills left on 2 out of the 4 prescriptions)    Will send a refill request to provider. Instructed patient that in march, 2019 Dr. Vane Prescott wanted to see him back in 3 months, patient to make the line. Instructed patient that I would send the request to the provider but I am also encouraging patient to make the line with in the next couple of days so that he can get follow up and refills for his other medications as well. Per chart I also noted that patient applied for medicaid in 04/2019 per patient he does not know the status of that, Nurse giving patient the medicaid expansion flyer so that he can call and check on the status of it. This has been fully explained to the patient, who indicates understanding and agrees with plan. No further questions at this time.  Chris Riley RN

## 2019-09-25 ENCOUNTER — HOSPITAL ENCOUNTER (OUTPATIENT)
Dept: LAB | Age: 59
Discharge: HOME | End: 2019-09-25

## 2019-09-25 ENCOUNTER — OFFICE VISIT (OUTPATIENT)
Dept: FAMILY MEDICINE CLINIC | Age: 59
End: 2019-09-25

## 2019-09-25 VITALS
SYSTOLIC BLOOD PRESSURE: 158 MMHG | WEIGHT: 230 LBS | HEART RATE: 93 BPM | TEMPERATURE: 98.8 F | BODY MASS INDEX: 36.96 KG/M2 | DIASTOLIC BLOOD PRESSURE: 77 MMHG

## 2019-09-25 DIAGNOSIS — I10 ESSENTIAL HYPERTENSION: ICD-10-CM

## 2019-09-25 DIAGNOSIS — E11.9 TYPE 2 DIABETES MELLITUS WITHOUT COMPLICATION, WITHOUT LONG-TERM CURRENT USE OF INSULIN (HCC): Primary | ICD-10-CM

## 2019-09-25 DIAGNOSIS — E11.9 TYPE 2 DIABETES MELLITUS WITHOUT COMPLICATION, WITHOUT LONG-TERM CURRENT USE OF INSULIN (HCC): ICD-10-CM

## 2019-09-25 LAB
ALBUMIN SERPL-MCNC: 3.2 G/DL (ref 3.5–5)
ALBUMIN/GLOB SERPL: 1 {RATIO} (ref 1.1–2.2)
ALP SERPL-CCNC: 89 U/L (ref 45–117)
ALT SERPL-CCNC: 22 U/L (ref 12–78)
ANION GAP SERPL CALC-SCNC: 7 MMOL/L (ref 5–15)
AST SERPL-CCNC: 8 U/L (ref 15–37)
BASOPHILS # BLD: 0 K/UL (ref 0–0.1)
BASOPHILS NFR BLD: 1 % (ref 0–1)
BILIRUB SERPL-MCNC: 0.3 MG/DL (ref 0.2–1)
BILIRUB UR QL STRIP: NEGATIVE
BUN SERPL-MCNC: 18 MG/DL (ref 6–20)
BUN/CREAT SERPL: 16 (ref 12–20)
CALCIUM SERPL-MCNC: 8.7 MG/DL (ref 8.5–10.1)
CHLORIDE SERPL-SCNC: 110 MMOL/L (ref 97–108)
CO2 SERPL-SCNC: 26 MMOL/L (ref 21–32)
CREAT SERPL-MCNC: 1.12 MG/DL (ref 0.7–1.3)
DIFFERENTIAL METHOD BLD: NORMAL
EOSINOPHIL # BLD: 0.1 K/UL (ref 0–0.4)
EOSINOPHIL NFR BLD: 1 % (ref 0–7)
ERYTHROCYTE [DISTWIDTH] IN BLOOD BY AUTOMATED COUNT: 13 % (ref 11.5–14.5)
EST. AVERAGE GLUCOSE BLD GHB EST-MCNC: 183 MG/DL
GLOBULIN SER CALC-MCNC: 3.1 G/DL (ref 2–4)
GLUCOSE POC: NORMAL MG/DL
GLUCOSE SERPL-MCNC: 362 MG/DL (ref 65–100)
GLUCOSE UR-MCNC: NORMAL MG/DL
HBA1C MFR BLD: 8 % (ref 4.2–6.3)
HCT VFR BLD AUTO: 44.3 % (ref 36.6–50.3)
HGB BLD-MCNC: 14.5 G/DL (ref 12.1–17)
IMM GRANULOCYTES # BLD AUTO: 0 K/UL (ref 0–0.04)
IMM GRANULOCYTES NFR BLD AUTO: 0 % (ref 0–0.5)
KETONES P FAST UR STRIP-MCNC: NEGATIVE MG/DL
LYMPHOCYTES # BLD: 1.3 K/UL (ref 0.8–3.5)
LYMPHOCYTES NFR BLD: 16 % (ref 12–49)
MCH RBC QN AUTO: 29.4 PG (ref 26–34)
MCHC RBC AUTO-ENTMCNC: 32.7 G/DL (ref 30–36.5)
MCV RBC AUTO: 89.7 FL (ref 80–99)
MONOCYTES # BLD: 0.7 K/UL (ref 0–1)
MONOCYTES NFR BLD: 9 % (ref 5–13)
NEUTS SEG # BLD: 5.7 K/UL (ref 1.8–8)
NEUTS SEG NFR BLD: 73 % (ref 32–75)
NRBC # BLD: 0 K/UL (ref 0–0.01)
NRBC BLD-RTO: 0 PER 100 WBC
PH UR STRIP: 5.5 [PH] (ref 4.6–8)
PLATELET # BLD AUTO: 218 K/UL (ref 150–400)
PMV BLD AUTO: 11.4 FL (ref 8.9–12.9)
POTASSIUM SERPL-SCNC: 4.1 MMOL/L (ref 3.5–5.1)
PROT SERPL-MCNC: 6.3 G/DL (ref 6.4–8.2)
PROT UR QL STRIP: NORMAL
RBC # BLD AUTO: 4.94 M/UL (ref 4.1–5.7)
SODIUM SERPL-SCNC: 143 MMOL/L (ref 136–145)
SP GR UR STRIP: 1.02 (ref 1–1.03)
UA UROBILINOGEN AMB POC: NORMAL (ref 0.2–1)
URINALYSIS CLARITY POC: CLEAR
URINALYSIS COLOR POC: YELLOW
URINE BLOOD POC: NORMAL
URINE LEUKOCYTES POC: NEGATIVE
URINE NITRITES POC: NEGATIVE
WBC # BLD AUTO: 7.8 K/UL (ref 4.1–11.1)

## 2019-09-25 PROCEDURE — 82043 UR ALBUMIN QUANTITATIVE: CPT

## 2019-09-25 PROCEDURE — 84443 ASSAY THYROID STIM HORMONE: CPT

## 2019-09-25 PROCEDURE — 85025 COMPLETE CBC W/AUTO DIFF WBC: CPT

## 2019-09-25 PROCEDURE — 83036 HEMOGLOBIN GLYCOSYLATED A1C: CPT

## 2019-09-25 PROCEDURE — 80053 COMPREHEN METABOLIC PANEL: CPT

## 2019-09-25 RX ORDER — LISINOPRIL AND HYDROCHLOROTHIAZIDE 12.5; 2 MG/1; MG/1
TABLET ORAL
Qty: 30 TAB | Refills: 0 | Status: SHIPPED | OUTPATIENT
Start: 2019-09-25 | End: 2019-09-25

## 2019-09-25 RX ORDER — AMLODIPINE BESYLATE 10 MG/1
10 TABLET ORAL DAILY
Qty: 30 TAB | Refills: 0 | Status: SHIPPED | OUTPATIENT
Start: 2019-09-25 | End: 2019-09-25 | Stop reason: SDUPTHER

## 2019-09-25 RX ORDER — GLIPIZIDE 10 MG/1
10 TABLET ORAL 2 TIMES DAILY
Qty: 180 TAB | Refills: 1 | Status: SHIPPED | OUTPATIENT
Start: 2019-09-25

## 2019-09-25 RX ORDER — LISINOPRIL AND HYDROCHLOROTHIAZIDE 12.5; 2 MG/1; MG/1
TABLET ORAL
Qty: 180 TAB | Refills: 1 | Status: SHIPPED | OUTPATIENT
Start: 2019-09-25

## 2019-09-25 RX ORDER — METFORMIN HYDROCHLORIDE 500 MG/1
1000 TABLET, EXTENDED RELEASE ORAL 2 TIMES DAILY WITH MEALS
Qty: 360 TAB | Refills: 1 | Status: SHIPPED | OUTPATIENT
Start: 2019-09-25

## 2019-09-25 RX ORDER — AMITRIPTYLINE HYDROCHLORIDE 10 MG/1
10 TABLET, FILM COATED ORAL
Qty: 90 TAB | Refills: 0 | Status: SHIPPED | OUTPATIENT
Start: 2019-09-25

## 2019-09-25 RX ORDER — AMLODIPINE BESYLATE 10 MG/1
10 TABLET ORAL DAILY
Qty: 90 TAB | Refills: 1 | Status: SHIPPED | OUTPATIENT
Start: 2019-09-25

## 2019-09-25 NOTE — PROGRESS NOTES
At discharge station AVS was printed and reviewed with pt with Ashley Luther as . Pt given Good RX  today for Lisinopril HCTZ and reviewed med dose change and new RX for Amitriptyline. I called Arpita Manuel and she knows that provider is asking for AN recrods for this pt regarding treatment he was given after EMG ( Injection of spine? ).  Fer Jules RN

## 2019-09-25 NOTE — PROGRESS NOTES
Coordination of Care  1. Have you been to the ER, urgent care clinic since your last visit? Hospitalized since your last visit? No    2. Have you seen or consulted any other health care providers outside of the [address] [place] [address] since your last visit? Include any pap smears or colon screening. No    Does the patient need refills? YES    Learning Assessment Complete?  yes  Depression Screening complete in the past 12 months? yes     Results for orders placed or performed in visit on 09/25/19   AMB POC GLUCOSE BLOOD, BY GLUCOSE MONITORING DEVICE   Result Value Ref Range    Glucose  NF mg/dL   AMB POC URINALYSIS DIP STICK MANUAL W/O MICRO   Result Value Ref Range    Color (UA POC) Yellow     Clarity (UA POC) Clear     Glucose (UA POC) 4+ Negative    Bilirubin (UA POC) Negative Negative    Ketones (UA POC) Negative Negative    Specific gravity (UA POC) 1.020 1.001 - 1.035    Blood (UA POC) Trace Negative    pH (UA POC) 5.5 4.6 - 8.0    Protein (UA POC) 3+ Negative    Urobilinogen (UA POC) 0.2 mg/dL 0.2 - 1    Nitrites (UA POC) Negative Negative    Leukocyte esterase (UA POC) Negative Negative

## 2019-09-25 NOTE — Clinical Note
At discharge station AVS was printed and reviewed with pt with Marva Rodriguez as . Pt given Good RX  today for Lisinopril HCTZ and reviewed med dose change and new RX for Amitriptyline. I called Shay Madrid and she knows that provider is asking for AN recrods for this pt regarding treatment he was given after EMG ( Injection of spine? ).  Marcelle Guajardo

## 2019-09-25 NOTE — PROGRESS NOTES
Assessment/Plan:       ICD-10-CM ICD-9-CM    1. Type 2 diabetes mellitus without complication, without long-term current use of insulin (HCC) E11.9 250.00 AMB POC GLUCOSE BLOOD, BY GLUCOSE MONITORING DEVICE      AMB POC URINALYSIS DIP STICK MANUAL W/O MICRO      CBC WITH AUTOMATED DIFF      HEMOGLOBIN A1C WITH EAG      MICROALBUMIN, UR, RAND W/ MICROALB/CREAT RATIO      METABOLIC PANEL, COMPREHENSIVE      TSH 3RD GENERATION      amitriptyline (ELAVIL) 10 mg tablet      glipiZIDE (GLUCOTROL) 10 mg tablet      metFORMIN ER (GLUCOPHAGE XR) 500 mg tablet   2. Essential hypertension I10 401.9 lisinopril-hydroCHLOROthiazide (PRINZIDE, ZESTORETIC) 20-12.5 mg per tablet      amLODIPine (NORVASC) 10 mg tablet     We need to get records from [facility] regarding the treatment that was given after the EMG was done  He takes Advil for his legs. We discussed not to take this due to HTN and albuminuria. Leaves oct 1 for [place] for 2 months. Will make medication change today to increase lisin/hctz 20/12.5, take 2 every morning. Asking for something for pain for the legs. I want him to stop all forms of NSAIDS. OK to take tylenol 500mg, 1-2 po bid and may try amitriptyline 10 mg po qhs. Follow-up and Dispositions    · Return for diabetes 3 months LK. [address] [place] [address]  [address] [address]  Phone: [phone] Fax: [phone]      [address]  Subjective:     Chief Complaint   Patient presents with    Medication Refill    Tod Lopez is a 64 y.o. OTHER male who speaks Niuean.  used? yes   HPI: ran out of medications? Yes - has not had the medicine for blood pressure for 2 or 3 days. He is taking vitamin B12. Last took metformin this morning and did not eat. Took glipizide and did not eat, at 6 am.   He did just eat lunch at 11 am.  [place] food, chicken teriaki, rice, and water.    ROS:   No increased frequency of urination, no increased thirst; no chest pain, dyspnea or TIA's; no numbness, tingling or pain in extremities; no reports of hypoglycemia. Diabetes   Brought in medications? no   Last took medications: today at 6 am for diabetes, then ate at 11 am; has been out of bp medications for 2 days. Taking medications as prescribed? Seems to be    Last ate: today     Medications:    Current Outpatient Medications:     lisinopril-hydroCHLOROthiazide (PRINZIDE, ZESTORETIC) 20-12.5 mg per tablet, TAKE 1 TABLET BY MOUTH ONCE DAILY IN THE MORNING    amLODIPine (NORVASC) 10 mg tablet, Take 1 Tab by mouth daily. For blood pressure. Instead of the 5mg pill. Haitian., Disp: 90 Tab, Rfl: 1    glipiZIDE (GLUCOTROL) 10 mg tablet, Take 1 Tab by mouth two (2) times a day. Haitian sig, Disp: 180 Tab, Rfl: 1    metFORMIN ER (GLUCOPHAGE XR) 500 mg tablet, Take 2 Tabs by mouth two (2) times daily (with meals). Haitian sig, Disp: 360 Tab, Rfl: 1    cyanocobalamin (VITAMIN B-12) 1,000 mcg sublingual tablet, 2 tabs per sublingual route daily. , Disp: 180 Tab, Rfl: 1  Social History: He reports that he has quit smoking. He has never used smokeless tobacco. He reports that he drinks alcohol. He reports that he does not use drugs. Family History: His family history is not on file. Objective:     Vitals:    09/25/19 1259   BP: 158/77   Pulse: 93   Temp: 98.8 °F (37.1 °C)   TempSrc: Oral   Weight: 230 lb (104.3 kg)    No LMP for male patient.     Results for orders placed or performed in visit on 09/25/19   AMB POC GLUCOSE BLOOD, BY GLUCOSE MONITORING DEVICE   Result Value Ref Range    Glucose  NF mg/dL   AMB POC URINALYSIS DIP STICK MANUAL W/O MICRO   Result Value Ref Range    Color (UA POC) Yellow     Clarity (UA POC) Clear     Glucose (UA POC) 4+ Negative    Bilirubin (UA POC) Negative Negative    Ketones (UA POC) Negative Negative    Specific gravity (UA POC) 1.020 1.001 - 1.035    Blood (UA POC) Trace Negative    pH (UA POC) 5.5 4.6 - 8.0    Protein (UA POC) 3+ Negative    Urobilinogen (UA POC) 0.2 mg/dL 0.2 - 1    Nitrites (UA POC) Negative Negative    Leukocyte esterase (UA POC) Negative Negative      Wt Readings from Last 2 Encounters:   09/25/19 230 lb (104.3 kg)   03/29/19 222 lb (100.7 kg)    Weight increased; Hemoglobin A1c   Date Value Ref Range Status   02/15/2019 6.6 (H) 4.2 - 6.3 % Final   07/07/2018 6.6 (H) 4.2 - 6.3 % Final    A1C unchanged;   Lab Results   Component Value Date/Time    Microalbumin/Creat ratio (mg/g creat) 1,361 (H) 02/15/2019 12:33 PM    Microalbumin,urine random 103.00 02/15/2019 12:33 PM    Creatinine 1.14 03/29/2019 04:11 PM      Lab Results   Component Value Date/Time    GFR est AA >60 03/29/2019 04:11 PM    GFR est non-AA >60 03/29/2019 04:11 PM       Lab Results   Component Value Date/Time    Cholesterol, total 208 (H) 03/09/2018 10:01 AM    HDL Cholesterol 69 03/09/2018 10:01 AM    LDL, calculated 121 (H) 03/09/2018 10:01 AM    Triglyceride 90 03/09/2018 10:01 AM    CHOL/HDL Ratio 3.0 03/09/2018 10:01 AM      Lab Results   Component Value Date/Time    ALT (SGPT) 29 03/09/2018 10:01 AM    AST (SGOT) 15 03/09/2018 10:01 AM    Alk. phosphatase 99 03/09/2018 10:01 AM    Bilirubin, total 0.5 03/09/2018 10:01 AM     Constitutional: He appears well-developed. Eyes: EOM are normal. Pupils are equal, round, and reactive to light. Neck: Neck supple. No thyromegaly present. Cardiovascular: Normal rate, regular rhythm, normal heart sounds and intact distal pulses. No murmur heard. Pulmonary/Chest: Effort normal and breath sounds normal.   Musculoskeletal: He exhibits no edema. No ulcers of the lower extremities. Assessment/Plan:   Diagnoses and all orders for this visit:    1.  Type 2 diabetes mellitus without complication, without long-term current use of insulin (HCC)  -     AMB POC GLUCOSE BLOOD, BY GLUCOSE MONITORING DEVICE  -     AMB POC URINALYSIS DIP STICK MANUAL W/O MICRO  -     CBC WITH AUTOMATED DIFF; Future  -     HEMOGLOBIN A1C WITH EAG; Future  -     MICROALBUMIN, UR, RAND W/ MICROALB/CREAT RATIO; Future  -     METABOLIC PANEL, COMPREHENSIVE; Future  -     TSH 3RD GENERATION; Future  -     amitriptyline (ELAVIL) 10 mg tablet; Take 1 Tab by mouth nightly. For leg pain. Nevaeh 1 tab en la noche para dolor de las piernas. -     glipiZIDE (GLUCOTROL) 10 mg tablet; Take 1 Tab by mouth two (2) times a day. Citizen of [place] sig  -     metFORMIN ER (GLUCOPHAGE XR) 500 mg tablet; Take 2 Tabs by mouth two (2) times daily (with meals). Citizen of [place] sig    2. Essential hypertension  -     lisinopril-hydroCHLOROthiazide (PRINZIDE, ZESTORETIC) 20-12.5 mg per tablet; TAKE 2 TABLETS BY MOUTH ONCE DAILY IN THE MORNING for blood pressure and kidneys; Citizen of [place] sig  -     amLODIPine (NORVASC) 10 mg tablet; Take 1 Tab by mouth daily. For blood pressure. Instead of the 5mg pill. Citizen of [place]. Diabetes Mellitus type 2, under Poor control. Blood pressure under Poor control. Greater than 50% of this 25 minute visit was spent in face-to-face counseling/coordination of care regarding diabetes management. Follow-up and Dispositions    · Return for diabetes 3 months LK. Joannie Hatchet, DNP, FNP-BC, BC-ADM  Keri Vo expressed understanding of this plan.

## 2019-09-25 NOTE — PATIENT INSTRUCTIONS
Nueva:     Current Outpatient Medications   Medication Sig Dispense Refill    amitriptyline (ELAVIL) 10 mg tablet PARA DOLOR DE LOS PIES Take 1 Tab by mouth nightly. For leg pain. Nevaeh 1 tab en la noche para dolor de las piernas. 90 Tab 0    lisinopril-hydroCHLOROthiazide (PRINZIDE, ZESTORETIC) 20-12.5 mg per tablet  NEVAEH 2 TAB JUNTAS EN LA MANANA. TAKE 2 TABLETS BY MOUTH ONCE DAILY IN THE MORNING for blood pressure and kidneys;  Urdu sig 180 Tab 1

## 2019-09-26 LAB
CREAT UR-MCNC: 74.6 MG/DL
MICROALBUMIN UR-MCNC: 123 MG/DL
MICROALBUMIN/CREAT UR-RTO: 1649 MG/G (ref 0–30)
TSH SERPL DL<=0.05 MIU/L-ACNC: 2.06 UIU/ML (ref 0.36–3.74)

## 2019-10-03 NOTE — PROGRESS NOTES
Alessandra Nolasoc RN sent to Eva Worthington NP      [facility],     The patient saw Dr. Bonita Leyva for EMG on 06-12-19. This was the patient's only visit to [address] [place] [address] per Dr. Benjamin Marroquin office. The office note can be found under Encounters, 06-12-19, and also under the Media tab on 06-13-19. It appears that Dr. Gian Ritter recommended Physical Therapy and/or Ortho Spine if his symptoms continue.  This could be done as another [facility] referral if needed. His current application is good through 04-12-20.     -Jossy      Patient left yesterday for [facility], has follow up appointment 12/27/19. Will discuss with patient at that time.   Ellen Ashton DNP, FNP-BC, BC-ADM  Board Certified in Advanced Diabetes Management